# Patient Record
Sex: MALE | Race: WHITE | NOT HISPANIC OR LATINO | Employment: OTHER | URBAN - METROPOLITAN AREA
[De-identification: names, ages, dates, MRNs, and addresses within clinical notes are randomized per-mention and may not be internally consistent; named-entity substitution may affect disease eponyms.]

---

## 2018-11-14 PROBLEM — Z72.0 TOBACCO ABUSE: Status: ACTIVE | Noted: 2018-11-14

## 2018-11-14 PROBLEM — M51.27 PROTRUSION OF INTERVERTEBRAL DISC OF LUMBOSACRAL REGION: Status: ACTIVE | Noted: 2018-11-14

## 2018-11-14 PROBLEM — M48.061 LUMBAR FORAMINAL STENOSIS: Status: ACTIVE | Noted: 2018-11-14

## 2021-03-04 ENCOUNTER — HISTORICAL (OUTPATIENT)
Dept: ADMINISTRATIVE | Facility: HOSPITAL | Age: 51
End: 2021-03-04

## 2021-07-01 ENCOUNTER — PATIENT MESSAGE (OUTPATIENT)
Dept: ADMINISTRATIVE | Facility: OTHER | Age: 51
End: 2021-07-01

## 2021-11-30 ENCOUNTER — PATIENT MESSAGE (OUTPATIENT)
Dept: RESEARCH | Facility: HOSPITAL | Age: 51
End: 2021-11-30

## 2022-04-10 ENCOUNTER — HISTORICAL (OUTPATIENT)
Dept: ADMINISTRATIVE | Facility: HOSPITAL | Age: 52
End: 2022-04-10

## 2022-04-26 VITALS
DIASTOLIC BLOOD PRESSURE: 78 MMHG | HEIGHT: 73 IN | WEIGHT: 151.69 LBS | SYSTOLIC BLOOD PRESSURE: 110 MMHG | BODY MASS INDEX: 20.11 KG/M2

## 2022-05-03 NOTE — HISTORICAL [facility] CERNER
This is a historical note converted from Jamar. Formatting and pictures may have been removed.  Please reference Jamar for original formatting and attached multimedia. Chief Complaint  Referred for ulnar nerve entrapment @ Lt elbow  History of Present Illness  50?yo?male?smoker?presents to ortho clinic for?initial visit?for?left?hand pain/ numbness.? Patient points to??diffuse left?UE. Patient dominate hand:? ?right  Today:? New referral for left ulnar neuropathy at elbow moderate according to EMG study.? Patient in clinic wearing left arm sling.? Reports neck, shoulder and diffuse arm pain and stiffness.  Onset:??9/2020?progressively worsening  Current pain level: 10/10??without pain medication. Quality described as??numbness?.? Patient?denies?use of pain medication today.?  Medications r/t complaint: Patient reports using?RX / OTC?medications in past including:?OTC pain relievers,?Norco, cyclobenzaprine, diclofenac, gabapentin, baclofen, meloxicam?. ?Currently taking?Norco, cyclobenzaprine??PRN?pain with?moderate?relief.?  Modifying Factors: ?Worse with/after activity;?Improved with rest;??Stiffness after immobilization?@ elbow??Stiffness improved with less than 30 minutes of activity  Injury:?fall in?9/2021 with?symptoms developing?afterwards  Previous treatment: HEP?denies ?; RX NSAIDs, PT??denies?, CSI denies  Associated Symptoms:?No Crepitus/Grinding; ?Numbness/ tingling? left UE;??No swelling;?No skin changes;?Weakness of ?left UE with dropping items;?Moderate decrease in ROM;?difficulty sleeping at night s/t pain  Activity:?Sedentary, full ADLs;?Pain occasionally interferes with ADLs (moderate)  Family History:?Family history of arthritis  PMH:? denies CVD; denies DM ; denies RA; denies gout; denies ?RX anticoagulants; denies intolerance to NSAIDs s/t GI issues; denies intolerance to NSAIDs s/t kidney disease  PCP:?Mouna DE LA GARZA?, referral source same  Employment:? 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 none  Review of Systems  Constitutional:No fever;No chills;No weight loss  CV:No swelling;No edema  GI:No urinary retention;No urinary incontinence  :No fecal incontinence  Skin:No rash;No wound  Neuro:No numbness/tingling;No weakness;No saddle anesthesia  MSK: As above  Psych:No depression;No anxiety  Heme/Lymph:No easy bruising;No easy bleeding;No lymphadenopathy  Immuno:No MRSA history  Physical Exam  Vitals & Measurements  HR:?103(Peripheral)? BP:?110/78?  HT:?185.45?cm? WT:?68.800?kg? BMI:?20?  MSK:?LEFT? HAND/ WRIST  General: No apparent distress;?well-nourished  Inspection:?No masses/cyst/nodules,?no deformity, no skin discoloration, no contracture;?no scars; no muscle atrophy to the thenar eminence or intrinsic muscles of the first web space  Palpation:? pain?and stiffness?with palpation?and movement?in elbow;??No swelling;?No bony enlargement  ROM:?  ?????Open/closure of hand:?no abnormalities, movement is full and smooth  Strength:??  ????? strength:?Reduced  Special Tests:  ?????Trigger finger presence:??Negative  ?????Phalen test:?Negative?  ?????Tinel test:?Positive?  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 Discussed with patient diagnosis and treatment recommendations.? Handout given.  2.? Imaging:?Radiological studies ordered, reviewed?and independently interpreted by me; Discussed with patient? DJD of s-spine noted, no acute findings in shoulder; awaiting radiologist  findings.  3.? Treatment plan: Conservative treatment to include ?oral glucosamine 1500 mg/day; Topical capsaicin as needed; Hot or cold therapy; Adequate vitamin C/D intake; patient encouraged to discontinue sling in order to improve stiffness in UE joints and shoulder. HEP given.  4.? Procedure:?none  5.? Activity:?Activity as tolerated; activity modifications as necessary  6.? Therapy:?none  7.? Medication:? continue medications as RX per PCP; RX gabapentin TID for symptom relief while awaiting ortho res. appt. ; medication precautions given.  8.? RTC:?next available with ortho res. ? for eval ?for surgical release  9.? Note:?None  2.?Degenerative joint disease of cervical spine?M47.812  XR showing DJD in cervical spine that may be contributing to patients symptoms.? Discussed with patient that symptoms r/t c-spine will not improve with surgical treatment of ulnar release at elbow.? He will need to f/u with PCP for complete work up and referral to neurologist if found to be appropriate. Patient still desires discussion with ortho res. for treatment options.  3.?Tobacco user?Z72.0  ?1.??Patient advised to discontinue smoking  2.? Education on dangers of continued smoking given  3.? Patient made aware of available smoking cessation resources  4.? Patient?declines?smoking cessation?at this time  Orders:  gabapentin, 300 mg = 1 cap(s), Oral, TID, Take 1 capsule per Day for 3 Days, then take as RX, # 90 cap(s), 1 Refill(s), Pharmacy: [facility], 185.45, cm, Height/Length Dosing, 03/04/21 10:28:00 CST, 68.8, kg, Weight Dosing, 03/04/21 10:28:00 CST  XR Shoulder Left Minimum 2 Views, Routine, 03/04/21 11:01:00 CST, None, Ambulatory, Rad Type, Pain, Not Scheduled, 03/04/21 11:01:00 CST  XR Spine Cervical Complete W Flexion/Ext, Routine, 03/04/21 11:02:00 CST, None, Ambulatory, Rad Type, Pain, Not Scheduled, 03/04/21 11:02:00 CST   Problem List/Past Medical History  Ongoing  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  Historical  No qualifying data  Procedure/Surgical History  Partial colectomy   Medications  acetaminophen-hydrocodone 325 mg-7.5 mg oral tablet, 1 tab(s), Oral, TID  cyclobenzaprine 7.5 mg oral tablet, 7.5 mg= 1 tab(s), Oral, qPM  gabapentin 300 mg oral capsule, 300 mg= 1 cap(s), Oral, TID, 1 refills  nicotine 14 mg/24 hr transdermal film, extended release, 1 patch(es), TOP, Daily  Allergies  quinolone antibiotics?(Anaphylactic reaction)  Social History  Abuse/Neglect  No, No, Yes, 03/04/2021  Tobacco  10 or more cigarettes (1/2 pack or more)/day in last 30 days, Cigarettes, Yes, 03/04/2021  Family History  Dementia: Father.  Hypertension.: Father.  Health Maintenance  Health Maintenance  ???Pending?(in the next year)  ??? ??OverDue  ??? ? ? ?Smoking Cessation due??01/01/21??Variable frequency  ??? ? ? ?Alcohol Misuse Screening due??01/02/21??and every 1??year(s)  ??? ??Due?  ??? ? ? ?ADL Screening due??03/04/21??and every 1??year(s)  ??? ? ? ?Aspirin Therapy for CVD Prevention due??03/04/21??and every 1??year(s)  ??? ? ? ?Tetanus Vaccine due??03/04/21??and every 10??year(s)  ??? ??Due In Future?  ??? ? ? ?Obesity Screening not due until??01/01/22??and every 1??year(s)  ???Satisfied?(in the past 1 year)  ??? ??Satisfied?  ??? ? ? ?Blood Pressure Screening on??03/04/21.??Satisfied by Shakira Lorenz LPN  ??? ? ? ?Body Mass Index Check on??03/04/21.??Satisfied by Shakira Lorenz LPN  ??? ? ? ?Depression Screening on??03/04/21.??Satisfied by Shakira Lorenz LPN  ??? ? ? ?Influenza Vaccine on??03/04/21.??Satisfied by Shakira Lorenz LPN  ??? ? ? ?Obesity Screening on??03/04/21.??Satisfied by Shakira Lorenz LPN  ?  Diagnostic Results  EMG study dated 1/14/21 [facility]:  NCS/EMG of?LUE?is compatible with moderate?ulnar neuropathy at the level of the elbow.? There is no evidence of myopathy or radiculopathy.